# Patient Record
Sex: FEMALE | Race: WHITE | NOT HISPANIC OR LATINO | Employment: OTHER | URBAN - METROPOLITAN AREA
[De-identification: names, ages, dates, MRNs, and addresses within clinical notes are randomized per-mention and may not be internally consistent; named-entity substitution may affect disease eponyms.]

---

## 2023-01-01 ENCOUNTER — APPOINTMENT (OUTPATIENT)
Dept: RADIOLOGY | Facility: CLINIC | Age: 83
End: 2023-01-01
Attending: EMERGENCY MEDICINE
Payer: COMMERCIAL

## 2023-01-01 ENCOUNTER — APPOINTMENT (OUTPATIENT)
Dept: CT IMAGING | Facility: CLINIC | Age: 83
End: 2023-01-01
Attending: EMERGENCY MEDICINE
Payer: COMMERCIAL

## 2023-01-01 ENCOUNTER — LAB REQUISITION (OUTPATIENT)
Dept: LAB | Facility: CLINIC | Age: 83
End: 2023-01-01
Payer: COMMERCIAL

## 2023-01-01 ENCOUNTER — HOSPITAL ENCOUNTER (EMERGENCY)
Facility: CLINIC | Age: 83
Discharge: ANOTHER HOSPITAL | End: 2023-06-30
Attending: EMERGENCY MEDICINE | Admitting: EMERGENCY MEDICINE
Payer: COMMERCIAL

## 2023-01-01 ENCOUNTER — HOSPITAL ENCOUNTER (EMERGENCY)
Facility: CLINIC | Age: 83
Discharge: ANOTHER HOSPITAL | End: 2023-06-11
Attending: EMERGENCY MEDICINE | Admitting: EMERGENCY MEDICINE
Payer: COMMERCIAL

## 2023-01-01 ENCOUNTER — LAB REQUISITION (OUTPATIENT)
Dept: LAB | Facility: CLINIC | Age: 83
End: 2023-01-01

## 2023-01-01 VITALS
TEMPERATURE: 97.5 F | DIASTOLIC BLOOD PRESSURE: 78 MMHG | OXYGEN SATURATION: 97 % | HEIGHT: 66 IN | BODY MASS INDEX: 22.5 KG/M2 | SYSTOLIC BLOOD PRESSURE: 143 MMHG | HEART RATE: 65 BPM | RESPIRATION RATE: 27 BRPM | WEIGHT: 140 LBS

## 2023-01-01 VITALS
TEMPERATURE: 97.7 F | HEIGHT: 66 IN | RESPIRATION RATE: 18 BRPM | BODY MASS INDEX: 23.46 KG/M2 | WEIGHT: 146 LBS | SYSTOLIC BLOOD PRESSURE: 132 MMHG | OXYGEN SATURATION: 96 % | HEART RATE: 78 BPM | DIASTOLIC BLOOD PRESSURE: 60 MMHG

## 2023-01-01 DIAGNOSIS — R35.0 FREQUENCY OF MICTURITION: ICD-10-CM

## 2023-01-01 DIAGNOSIS — E11.9 TYPE 2 DIABETES MELLITUS WITHOUT COMPLICATIONS (H): ICD-10-CM

## 2023-01-01 DIAGNOSIS — G31.83 LEWY BODY DEMENTIA, UNSPECIFIED DEMENTIA SEVERITY, UNSPECIFIED WHETHER BEHAVIORAL, PSYCHOTIC, OR MOOD DISTURBANCE OR ANXIETY (H): ICD-10-CM

## 2023-01-01 DIAGNOSIS — E07.9 DISORDER OF THYROID, UNSPECIFIED: ICD-10-CM

## 2023-01-01 DIAGNOSIS — I10 ESSENTIAL (PRIMARY) HYPERTENSION: ICD-10-CM

## 2023-01-01 DIAGNOSIS — I26.93 SINGLE SUBSEGMENTAL PULMONARY EMBOLISM WITHOUT ACUTE COR PULMONALE (H): ICD-10-CM

## 2023-01-01 DIAGNOSIS — S06.6X0A SUBARACHNOID HEMORRHAGE FOLLOWING INJURY, NO LOSS OF CONSCIOUSNESS, INITIAL ENCOUNTER (H): Primary | ICD-10-CM

## 2023-01-01 DIAGNOSIS — E55.9 VITAMIN D DEFICIENCY, UNSPECIFIED: ICD-10-CM

## 2023-01-01 DIAGNOSIS — S00.83XA CONTUSION OF CHIN, INITIAL ENCOUNTER: ICD-10-CM

## 2023-01-01 DIAGNOSIS — G91.2 NORMAL PRESSURE HYDROCEPHALUS (H): ICD-10-CM

## 2023-01-01 DIAGNOSIS — E53.8 DEFICIENCY OF OTHER SPECIFIED B GROUP VITAMINS: ICD-10-CM
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
## 2023-01-01 DIAGNOSIS — F02.80 LEWY BODY DEMENTIA, UNSPECIFIED DEMENTIA SEVERITY, UNSPECIFIED WHETHER BEHAVIORAL, PSYCHOTIC, OR MOOD DISTURBANCE OR ANXIETY (H): ICD-10-CM

## 2023-01-01 DIAGNOSIS — S20.212A CHEST WALL CONTUSION, LEFT, INITIAL ENCOUNTER: ICD-10-CM

## 2023-01-01 DIAGNOSIS — M62.81 GENERALIZED MUSCLE WEAKNESS: ICD-10-CM

## 2023-01-01 LAB
ALBUMIN SERPL BCG-MCNC: 3.9 G/DL (ref 3.5–5.2)
ALBUMIN SERPL-MCNC: 3.7 G/DL (ref 3.5–5)
ALBUMIN UR-MCNC: 10 MG/DL
ALBUMIN UR-MCNC: 20 MG/DL
ALP SERPL-CCNC: 100 U/L (ref 35–104)
ALP SERPL-CCNC: 95 U/L (ref 45–120)
ALT SERPL W P-5'-P-CCNC: 11 U/L (ref 10–35)
ALT SERPL W P-5'-P-CCNC: 17 U/L (ref 0–45)
AMORPH CRY #/AREA URNS HPF: ABNORMAL /HPF
ANION GAP SERPL CALCULATED.3IONS-SCNC: 11 MMOL/L (ref 7–15)
ANION GAP SERPL CALCULATED.3IONS-SCNC: 8 MMOL/L (ref 5–18)
ANION GAP SERPL CALCULATED.3IONS-SCNC: 8 MMOL/L (ref 5–18)
APPEARANCE UR: ABNORMAL
APPEARANCE UR: ABNORMAL
APTT PPP: 26 SECONDS (ref 22–38)
AST SERPL W P-5'-P-CCNC: 18 U/L (ref 10–35)
AST SERPL W P-5'-P-CCNC: 28 U/L (ref 0–40)
ATRIAL RATE - MUSE: 68 BPM
BACTERIA UR CULT: NORMAL
BASOPHILS # BLD AUTO: 0.1 10E3/UL (ref 0–0.2)
BASOPHILS NFR BLD AUTO: 1 %
BILIRUB SERPL-MCNC: 0.4 MG/DL
BILIRUB SERPL-MCNC: 0.6 MG/DL (ref 0–1)
BILIRUB UR QL STRIP: NEGATIVE
BILIRUB UR QL STRIP: NEGATIVE
BUN SERPL-MCNC: 11.7 MG/DL (ref 8–23)
BUN SERPL-MCNC: 19 MG/DL (ref 8–28)
BUN SERPL-MCNC: 22 MG/DL (ref 8–28)
CALCIUM SERPL-MCNC: 10.4 MG/DL (ref 8.5–10.5)
CALCIUM SERPL-MCNC: 10.4 MG/DL (ref 8.5–10.5)
CALCIUM SERPL-MCNC: 9.8 MG/DL (ref 8.8–10.2)
CAOX CRY #/AREA URNS HPF: ABNORMAL /HPF
CHLORIDE BLD-SCNC: 100 MMOL/L (ref 98–107)
CHLORIDE BLD-SCNC: 104 MMOL/L (ref 98–107)
CHLORIDE SERPL-SCNC: 103 MMOL/L (ref 98–107)
CO2 SERPL-SCNC: 28 MMOL/L (ref 22–31)
CO2 SERPL-SCNC: 29 MMOL/L (ref 22–31)
COLOR UR AUTO: YELLOW
COLOR UR AUTO: YELLOW
CREAT SERPL-MCNC: 0.65 MG/DL (ref 0.51–0.95)
CREAT SERPL-MCNC: 0.68 MG/DL (ref 0.6–1.1)
CREAT SERPL-MCNC: 0.76 MG/DL (ref 0.6–1.1)
DEPRECATED CALCIDIOL+CALCIFEROL SERPL-MC: 48 UG/L (ref 20–75)
DEPRECATED HCO3 PLAS-SCNC: 24 MMOL/L (ref 22–29)
DIASTOLIC BLOOD PRESSURE - MUSE: NORMAL MMHG
EOSINOPHIL # BLD AUTO: 0 10E3/UL (ref 0–0.7)
EOSINOPHIL NFR BLD AUTO: 0 %
ERYTHROCYTE [DISTWIDTH] IN BLOOD BY AUTOMATED COUNT: 12.7 % (ref 10–15)
ERYTHROCYTE [DISTWIDTH] IN BLOOD BY AUTOMATED COUNT: 12.8 % (ref 10–15)
ERYTHROCYTE [DISTWIDTH] IN BLOOD BY AUTOMATED COUNT: 12.9 % (ref 10–15)
FLUAV RNA SPEC QL NAA+PROBE: NEGATIVE
FLUBV RNA RESP QL NAA+PROBE: NEGATIVE
FOLATE SERPL-MCNC: 11.4 NG/ML (ref 4.6–34.8)
GFR SERPL CREATININE-BSD FRML MDRD: 79 ML/MIN/1.73M2
GFR SERPL CREATININE-BSD FRML MDRD: 88 ML/MIN/1.73M2
GFR SERPL CREATININE-BSD FRML MDRD: 89 ML/MIN/1.73M2
GLUCOSE BLD-MCNC: 102 MG/DL (ref 70–125)
GLUCOSE BLD-MCNC: 109 MG/DL (ref 70–125)
GLUCOSE SERPL-MCNC: 100 MG/DL (ref 70–99)
GLUCOSE UR STRIP-MCNC: NEGATIVE MG/DL
GLUCOSE UR STRIP-MCNC: NEGATIVE MG/DL
HBA1C MFR BLD: 5.6 %
HCT VFR BLD AUTO: 39 % (ref 35–47)
HCT VFR BLD AUTO: 42.6 % (ref 35–47)
HCT VFR BLD AUTO: 44.2 % (ref 35–47)
HGB BLD-MCNC: 12.5 G/DL (ref 11.7–15.7)
HGB BLD-MCNC: 13.8 G/DL (ref 11.7–15.7)
HGB BLD-MCNC: 14 G/DL (ref 11.7–15.7)
HGB UR QL STRIP: NEGATIVE
HGB UR QL STRIP: NEGATIVE
HYALINE CASTS: 3 /LPF
IMM GRANULOCYTES # BLD: 0.1 10E3/UL
IMM GRANULOCYTES NFR BLD: 1 %
INR PPP: 1.07 (ref 0.85–1.15)
INTERPRETATION ECG - MUSE: NORMAL
KETONES UR STRIP-MCNC: NEGATIVE MG/DL
KETONES UR STRIP-MCNC: NEGATIVE MG/DL
LEUKOCYTE ESTERASE UR QL STRIP: ABNORMAL
LEUKOCYTE ESTERASE UR QL STRIP: NEGATIVE
LYMPHOCYTES # BLD AUTO: 2.3 10E3/UL (ref 0.8–5.3)
LYMPHOCYTES NFR BLD AUTO: 20 %
MAGNESIUM SERPL-MCNC: 2.5 MG/DL (ref 1.8–2.6)
MCH RBC QN AUTO: 29.5 PG (ref 26.5–33)
MCH RBC QN AUTO: 29.6 PG (ref 26.5–33)
MCH RBC QN AUTO: 29.6 PG (ref 26.5–33)
MCHC RBC AUTO-ENTMCNC: 31.7 G/DL (ref 31.5–36.5)
MCHC RBC AUTO-ENTMCNC: 32.1 G/DL (ref 31.5–36.5)
MCHC RBC AUTO-ENTMCNC: 32.4 G/DL (ref 31.5–36.5)
MCV RBC AUTO: 91 FL (ref 78–100)
MCV RBC AUTO: 92 FL (ref 78–100)
MCV RBC AUTO: 93 FL (ref 78–100)
MONOCYTES # BLD AUTO: 0.7 10E3/UL (ref 0–1.3)
MONOCYTES NFR BLD AUTO: 7 %
MUCOUS THREADS #/AREA URNS LPF: PRESENT /LPF
MUCOUS THREADS #/AREA URNS LPF: PRESENT /LPF
NEUTROPHILS # BLD AUTO: 8.1 10E3/UL (ref 1.6–8.3)
NEUTROPHILS NFR BLD AUTO: 71 %
NITRATE UR QL: NEGATIVE
NITRATE UR QL: NEGATIVE
NRBC # BLD AUTO: 0 10E3/UL
NRBC BLD AUTO-RTO: 0 /100
P AXIS - MUSE: -22 DEGREES
PH UR STRIP: 6 [PH] (ref 5–7)
PH UR STRIP: 7 [PH] (ref 5–7)
PLATELET # BLD AUTO: 298 10E3/UL (ref 150–450)
PLATELET # BLD AUTO: 304 10E3/UL (ref 150–450)
PLATELET # BLD AUTO: 333 10E3/UL (ref 150–450)
POTASSIUM BLD-SCNC: 4.1 MMOL/L (ref 3.5–5)
POTASSIUM BLD-SCNC: 4.3 MMOL/L (ref 3.5–5)
POTASSIUM SERPL-SCNC: 4.3 MMOL/L (ref 3.4–5.3)
PR INTERVAL - MUSE: 126 MS
PROT SERPL-MCNC: 6.4 G/DL (ref 6.4–8.3)
PROT SERPL-MCNC: 6.7 G/DL (ref 6–8)
QRS DURATION - MUSE: 82 MS
QT - MUSE: 416 MS
QTC - MUSE: 442 MS
R AXIS - MUSE: -18 DEGREES
RADIOLOGIST FLAGS: ABNORMAL
RBC # BLD AUTO: 4.22 10E6/UL (ref 3.8–5.2)
RBC # BLD AUTO: 4.67 10E6/UL (ref 3.8–5.2)
RBC # BLD AUTO: 4.74 10E6/UL (ref 3.8–5.2)
RBC URINE: 2 /HPF
RBC URINE: 4 /HPF
RSV RNA SPEC NAA+PROBE: NEGATIVE
SARS-COV-2 RNA RESP QL NAA+PROBE: NEGATIVE
SODIUM SERPL-SCNC: 137 MMOL/L (ref 136–145)
SODIUM SERPL-SCNC: 138 MMOL/L (ref 136–145)
SODIUM SERPL-SCNC: 140 MMOL/L (ref 136–145)
SP GR UR STRIP: 1.02 (ref 1–1.03)
SP GR UR STRIP: 1.02 (ref 1–1.03)
SQUAMOUS EPITHELIAL: 1 /HPF
SYSTOLIC BLOOD PRESSURE - MUSE: NORMAL MMHG
T AXIS - MUSE: 38 DEGREES
TROPONIN I SERPL-MCNC: <0.01 NG/ML (ref 0–0.29)
TSH SERPL DL<=0.005 MIU/L-ACNC: 1.37 UIU/ML (ref 0.3–5)
TSH SERPL DL<=0.005 MIU/L-ACNC: 1.64 UIU/ML (ref 0.3–4.2)
UROBILINOGEN UR STRIP-MCNC: <2 MG/DL
UROBILINOGEN UR STRIP-MCNC: NORMAL MG/DL
VENTRICULAR RATE- MUSE: 68 BPM
VIT B12 SERPL-MCNC: 854 PG/ML (ref 232–1245)
WBC # BLD AUTO: 11.3 10E3/UL (ref 4–11)
WBC # BLD AUTO: 6.6 10E3/UL (ref 4–11)
WBC # BLD AUTO: 7.2 10E3/UL (ref 4–11)
WBC URINE: 0 /HPF
WBC URINE: <1 /HPF

## 2023-01-01 PROCEDURE — 96376 TX/PRO/DX INJ SAME DRUG ADON: CPT

## 2023-01-01 PROCEDURE — 250N000011 HC RX IP 250 OP 636: Performed by: EMERGENCY MEDICINE

## 2023-01-01 PROCEDURE — 93005 ELECTROCARDIOGRAM TRACING: CPT | Performed by: EMERGENCY MEDICINE

## 2023-01-01 PROCEDURE — 85730 THROMBOPLASTIN TIME PARTIAL: CPT | Performed by: EMERGENCY MEDICINE

## 2023-01-01 PROCEDURE — 70450 CT HEAD/BRAIN W/O DYE: CPT

## 2023-01-01 PROCEDURE — 74174 CTA ABD&PLVS W/CONTRAST: CPT

## 2023-01-01 PROCEDURE — 73590 X-RAY EXAM OF LOWER LEG: CPT | Mod: RT

## 2023-01-01 PROCEDURE — 84443 ASSAY THYROID STIM HORMONE: CPT | Mod: ORL | Performed by: FAMILY MEDICINE

## 2023-01-01 PROCEDURE — 96375 TX/PRO/DX INJ NEW DRUG ADDON: CPT | Mod: 59

## 2023-01-01 PROCEDURE — 85025 COMPLETE CBC W/AUTO DIFF WBC: CPT | Performed by: EMERGENCY MEDICINE

## 2023-01-01 PROCEDURE — 36415 COLL VENOUS BLD VENIPUNCTURE: CPT | Performed by: EMERGENCY MEDICINE

## 2023-01-01 PROCEDURE — 250N000011 HC RX IP 250 OP 636: Mod: JZ | Performed by: EMERGENCY MEDICINE

## 2023-01-01 PROCEDURE — P9603 ONE-WAY ALLOW PRORATED MILES: HCPCS | Mod: ORL | Performed by: FAMILY MEDICINE

## 2023-01-01 PROCEDURE — 96374 THER/PROPH/DIAG INJ IV PUSH: CPT | Mod: 59

## 2023-01-01 PROCEDURE — 83735 ASSAY OF MAGNESIUM: CPT | Performed by: EMERGENCY MEDICINE

## 2023-01-01 PROCEDURE — 74018 RADEX ABDOMEN 1 VIEW: CPT

## 2023-01-01 PROCEDURE — 81001 URINALYSIS AUTO W/SCOPE: CPT | Mod: ORL | Performed by: FAMILY MEDICINE

## 2023-01-01 PROCEDURE — 72125 CT NECK SPINE W/O DYE: CPT

## 2023-01-01 PROCEDURE — 87086 URINE CULTURE/COLONY COUNT: CPT | Mod: ORL | Performed by: FAMILY MEDICINE

## 2023-01-01 PROCEDURE — 80053 COMPREHEN METABOLIC PANEL: CPT | Performed by: EMERGENCY MEDICINE

## 2023-01-01 PROCEDURE — 81001 URINALYSIS AUTO W/SCOPE: CPT | Performed by: EMERGENCY MEDICINE

## 2023-01-01 PROCEDURE — 85027 COMPLETE CBC AUTOMATED: CPT | Performed by: EMERGENCY MEDICINE

## 2023-01-01 PROCEDURE — 85027 COMPLETE CBC AUTOMATED: CPT | Mod: ORL | Performed by: FAMILY MEDICINE

## 2023-01-01 PROCEDURE — 82607 VITAMIN B-12: CPT | Mod: ORL | Performed by: FAMILY MEDICINE

## 2023-01-01 PROCEDURE — 85610 PROTHROMBIN TIME: CPT | Performed by: EMERGENCY MEDICINE

## 2023-01-01 PROCEDURE — 99285 EMERGENCY DEPT VISIT HI MDM: CPT | Mod: 25

## 2023-01-01 PROCEDURE — 82746 ASSAY OF FOLIC ACID SERUM: CPT | Mod: ORL | Performed by: FAMILY MEDICINE

## 2023-01-01 PROCEDURE — 87637 SARSCOV2&INF A&B&RSV AMP PRB: CPT | Performed by: EMERGENCY MEDICINE

## 2023-01-01 PROCEDURE — 80053 COMPREHEN METABOLIC PANEL: CPT | Mod: ORL | Performed by: FAMILY MEDICINE

## 2023-01-01 PROCEDURE — 84443 ASSAY THYROID STIM HORMONE: CPT | Performed by: EMERGENCY MEDICINE

## 2023-01-01 PROCEDURE — 72170 X-RAY EXAM OF PELVIS: CPT

## 2023-01-01 PROCEDURE — 84484 ASSAY OF TROPONIN QUANT: CPT | Performed by: EMERGENCY MEDICINE

## 2023-01-01 PROCEDURE — 99291 CRITICAL CARE FIRST HOUR: CPT | Mod: 25

## 2023-01-01 PROCEDURE — 82306 VITAMIN D 25 HYDROXY: CPT | Mod: ORL | Performed by: FAMILY MEDICINE

## 2023-01-01 PROCEDURE — 72100 X-RAY EXAM L-S SPINE 2/3 VWS: CPT

## 2023-01-01 PROCEDURE — 73552 X-RAY EXAM OF FEMUR 2/>: CPT | Mod: RT

## 2023-01-01 PROCEDURE — 83036 HEMOGLOBIN GLYCOSYLATED A1C: CPT | Mod: ORL | Performed by: FAMILY MEDICINE

## 2023-01-01 PROCEDURE — 80048 BASIC METABOLIC PNL TOTAL CA: CPT | Performed by: EMERGENCY MEDICINE

## 2023-01-01 PROCEDURE — 36415 COLL VENOUS BLD VENIPUNCTURE: CPT | Mod: ORL | Performed by: FAMILY MEDICINE

## 2023-01-01 RX ORDER — VITAMIN B COMPLEX
25 TABLET ORAL DAILY
COMMUNITY

## 2023-01-01 RX ORDER — HYDRALAZINE HYDROCHLORIDE 20 MG/ML
10 INJECTION INTRAMUSCULAR; INTRAVENOUS EVERY 10 MIN PRN
Status: DISCONTINUED | OUTPATIENT
Start: 2023-01-01 | End: 2023-01-01 | Stop reason: HOSPADM

## 2023-01-01 RX ORDER — LABETALOL HYDROCHLORIDE 5 MG/ML
10 INJECTION, SOLUTION INTRAVENOUS EVERY 10 MIN PRN
Status: DISCONTINUED | OUTPATIENT
Start: 2023-01-01 | End: 2023-01-01 | Stop reason: HOSPADM

## 2023-01-01 RX ORDER — HYDRALAZINE HYDROCHLORIDE 20 MG/ML
10 INJECTION INTRAMUSCULAR; INTRAVENOUS ONCE
Status: COMPLETED | OUTPATIENT
Start: 2023-01-01 | End: 2023-01-01

## 2023-01-01 RX ORDER — SODIUM CHLORIDE 9 MG/ML
INJECTION, SOLUTION INTRAVENOUS CONTINUOUS PRN
Status: DISCONTINUED | OUTPATIENT
Start: 2023-01-01 | End: 2023-01-01 | Stop reason: HOSPADM

## 2023-01-01 RX ORDER — IOPAMIDOL 755 MG/ML
100 INJECTION, SOLUTION INTRAVASCULAR ONCE
Status: COMPLETED | OUTPATIENT
Start: 2023-01-01 | End: 2023-01-01

## 2023-01-01 RX ORDER — DONEPEZIL HYDROCHLORIDE 10 MG/1
20 TABLET, FILM COATED ORAL AT BEDTIME
COMMUNITY

## 2023-01-01 RX ORDER — ACETAMINOPHEN 325 MG/1
650 TABLET ORAL EVERY 4 HOURS PRN
COMMUNITY

## 2023-01-01 RX ORDER — FLUOXETINE 40 MG/1
40 CAPSULE ORAL AT BEDTIME
COMMUNITY

## 2023-01-01 RX ORDER — MIRTAZAPINE 15 MG/1
15 TABLET, FILM COATED ORAL AT BEDTIME
COMMUNITY

## 2023-01-01 RX ORDER — PYRIDOXINE HCL (VITAMIN B6) 100 MG
500 TABLET ORAL DAILY
COMMUNITY

## 2023-01-01 RX ORDER — HYDROMORPHONE HYDROCHLORIDE 1 MG/ML
0.5 INJECTION, SOLUTION INTRAMUSCULAR; INTRAVENOUS; SUBCUTANEOUS EVERY 30 MIN PRN
Status: DISCONTINUED | OUTPATIENT
Start: 2023-01-01 | End: 2023-01-01 | Stop reason: HOSPADM

## 2023-01-01 RX ORDER — DOCUSATE SODIUM 100 MG/1
100 CAPSULE, LIQUID FILLED ORAL EVERY OTHER DAY
COMMUNITY

## 2023-01-01 RX ADMIN — HYDROMORPHONE HYDROCHLORIDE 0.5 MG: 1 INJECTION, SOLUTION INTRAMUSCULAR; INTRAVENOUS; SUBCUTANEOUS at 06:05

## 2023-01-01 RX ADMIN — HYDRALAZINE HYDROCHLORIDE 10 MG: 20 INJECTION, SOLUTION INTRAMUSCULAR; INTRAVENOUS at 09:48

## 2023-01-01 RX ADMIN — IOPAMIDOL 90 ML: 755 INJECTION, SOLUTION INTRAVENOUS at 07:10

## 2023-01-01 RX ADMIN — HYDRALAZINE HYDROCHLORIDE 10 MG: 20 INJECTION, SOLUTION INTRAMUSCULAR; INTRAVENOUS at 09:11

## 2023-01-01 ASSESSMENT — ACTIVITIES OF DAILY LIVING (ADL)
ADLS_ACUITY_SCORE: 35

## 2023-06-11 PROBLEM — G91.9 HYDROCEPHALUS WITH OPERATING SHUNT (H): Status: ACTIVE | Noted: 2018-03-27

## 2023-06-11 PROBLEM — F02.80 LEWY BODY DEMENTIA (H): Status: ACTIVE | Noted: 2022-01-01

## 2023-06-11 PROBLEM — M25.632 WRIST STIFFNESS, LEFT: Status: ACTIVE | Noted: 2017-06-06

## 2023-06-11 PROBLEM — M79.642 HAND PAIN, LEFT: Status: ACTIVE | Noted: 2017-06-06

## 2023-06-11 PROBLEM — M43.16 SPONDYLOLISTHESIS OF LUMBAR REGION: Status: ACTIVE | Noted: 2018-12-17

## 2023-06-11 PROBLEM — R41.89 COGNITIVE DECLINE: Status: ACTIVE | Noted: 2021-07-12

## 2023-06-11 PROBLEM — G91.2 NORMAL PRESSURE HYDROCEPHALUS (H): Status: ACTIVE | Noted: 2018-03-14

## 2023-06-11 PROBLEM — M54.50 LOW BACK PAIN: Status: ACTIVE | Noted: 2020-07-21

## 2023-06-11 PROBLEM — C44.91 MALIGNANT BASAL CELL NEOPLASM OF SKIN: Status: ACTIVE | Noted: 2023-01-01

## 2023-06-11 PROBLEM — G31.83 LEWY BODY DEMENTIA (H): Status: ACTIVE | Noted: 2022-01-01

## 2023-06-11 PROBLEM — Z87.81 HISTORY OF WRIST FRACTURE: Status: ACTIVE | Noted: 2017-06-06

## 2023-06-11 NOTE — ED PROVIDER NOTES
EMERGENCY DEPARTMENT ENCOUNTER      NAME: Lo Ochoa  AGE: 82 year old female  YOB: 1940  MRN: [id number]  EVALUATION DATE & TIME: 6/11/2023  2:12 PM    PCP: No Ref-Primary, Physician    ED PROVIDER: Samantha Ya MD      Chief Complaint   Patient presents with     Generalized Weakness         FINAL IMPRESSION:  1. Generalized muscle weakness    2. Normal pressure hydrocephalus (H)    3. Lewy body dementia, unspecified dementia severity, unspecified whether behavioral, psychotic, or mood disturbance or anxiety (H)          ED COURSE & MEDICAL DECISION MAKING:    Pertinent Labs & Imaging studies reviewed. (See chart for details)    2:48 PM I introduced myself to the patient, obtained patient history, performed a physical exam, and discussed plan for ED workup including potential diagnostic laboratory/imaging studies and interventions.  5:35 PM I rechecked and updated the patient.   7:06 PM I spoke with [facility] about the patient. They are unable to get a hold of the Hospitalist and will keep trying to contact them.  7:42 PM I spoke with neurosurgery at [facility]. They plan to see the patient tomorrow and agree she should be admitted.  8:10 PM I spoke with the hospitalist at [facility]. They agree to accept the patient.    82 year old female with history of Lewy body dementia, normal pressure hydrocephalus with  shunt, depression, spondylolisthesis of the lumbar region presents to the Emergency Department for evaluation of bilateral leg weakness.  Patient is here with her daughter.  Daughter reports that over the past week or so the patient may have been having a little bit of trouble ambulating but specifically since yesterday evening she had been requiring people to help her get out of a chair or bed and help her to ambulate.  Daughter describes that she is able to take a step but it is very slow and then she needs to stop.  Patient typically ambulates  without an assistive device.  This is much different than her baseline per daughter.  Daughter does report that she has had a bit of a cognitive decline slowly but her mental status is at its baseline.  Daughter also reports that she appears to have normal strength when she is lying in bed but it is specifically when she tries to walk that she is having this difficulty.  States she is very slow and shaky while trying to do this.  Patient reports that it feels like something is not communicated with her legs to try to take a step forward.  She does report that she had a fall approximately 3 days ago where she lost her balance and fell against the wall and then slowly slid down to the floor.  Sounds like she did hit her head but denies any headache or loss of consciousness.  Denies any neck or back pain.  Denies any pain initially in the legs hip or pelvis.  No changes to her medication.  Daughter does report that she has some parkinsonian-like symptoms but does not appear to be on any medication specifically for this.  Last had the shunt evaluated in 2021 and this was working appropriately.  Her shunt was placed by neurosurgery at [facility].  No upper extremity weakness.  No paresthesias or numbness.    On exam here, the patient is in no acute distress.  She denies being in any pain.  Vital signs are within normal limits and she is afebrile.  Does not really have any specific bony tenderness but with examination of the right hip area she states maybe there is some slight discomfort when I am pressing in the area but does not know Short otherwise.  She also reports some discomfort when I am pressing on her right tibia and fibula diffusely.  Again no point bony tenderness.  Pelvis is stable and nontender to compression.  Also has no midline tenderness thoracic or lumbar spine and normal range of motion of the back.  She is able to sit up in bed with some slight assistance without discomfort in the back.  States she does  not have any pain when she is ambulating.  Neurologically she does not have any deficits on my exam.  While she is lying in the bed she has 5-5 strength in all 4 extremities including the bilateral extremities.  No sensation deficit.  She has normal reflexes 2+ in the patellar and Achilles thus making Guillain-Barré less likely.  Considered CVA however again has no focal deficit here lying in bed and she is outside of the tenecteplase or thrombectomy window at this point.  Did consider the potential of shunt malfunction with her history of normal pressure hydrocephalus.  Also considered infectious etiology such as UTI or pneumonia.  Chest x-ray is present on the shunt series that we are obtaining so will evaluate this.  Obtained a COVID-19 test and laboratory studies.  Want to ensure no sign of injury or fracture with the previous fall although again she really is complaining of no pain here.  She has no pain with logroll of either leg in the hip area.  Will obtain x-ray of the pelvis, right femur, and right tibia and fibula as well as the lumbar spine.  Has no focal deficit in the lower extremities on exam here that would suggest spinal cord compression.  Also considered potential cardiac etiology and obtain an EKG.    EKG reveals normal sinus rhythm.  Normal intervals.  Some flattening of the T waves but no specific sign of acute ischemia.  Obtain a troponin which is less than 0.01 and she has had no chest pain or shortness of breath overall felt that cardiac etiology was unlikely.  Did also obtain a TSH which is in normal limits making thyroid dysfunction unlikely.  Magnesium within normal limits.  BMP unremarkable with normal electrolytes.  Kidney function within normal limits.  CBC reveals a normal blood cell count of 7.2 and a hemoglobin of 13.8.  Has not had any new medication changes making medication side effect unlikely.  COVID-19 influenza and RSV is negative.  Again with her intact reflexes and normal  exam while lying in bed felt like Guillain-Barré was less likely.  Analysis without any white blood cells and no sign of UTI.    CT of the head was obtained as well as shunt malfunction survey x-ray.  X-rays of the pelvis, right femur, and right tibia and fibula without any acute fracture.  X-ray lumbar spine reveals L4-L5 posterior fixation without acute hardware complication.  Mild levocurvature.  7 mm anterolisthesis of L4 on L5 minimal retrolisthesis of L2 on L3 with severe L2-L3 disc height loss.  Per her chart this seems to be baseline for her.  Age-indeterminate L1 superior endplate deformity with mild height loss which is age-indeterminate.  Again on reevaluation she is denying having any pain in her back and has no pain with ambulating per patient.  She also has no tenderness to palpation in the back in this area to suggest this to be an acute fracture as the cause of her bilateral leg weakness.  Thus felt this was less likely to be causing her symptoms.  Also has no focal deficit on exam to suggest that she would have spinal cord compression.  CT of the head reveals no acute transcortical infarct intracranial hemorrhage or mass effect.  Supratentorial ventriculomegaly with ventricular shunt in place.  Lack of comparison imaging precludes assessment for stability/change.  Findings compatible with chronic ischemic microvascular white matter change.  Features of chronic left maxillary sinusitis.  Shunt malfunction series not show any  shunt catheter tubing discontinuity.  No sign of pneumonia.     At this point, I am uncertain of the cause of her symptoms but do feel she warrants admission for further evaluation.  With the fact that she has had a  shunt placed and we are uncertain if this ventriculomegaly is worse than previous do feel that she should be admitted where her neurosurgery team is located which is at [facility].  Patient and her daughter are in agreement with this plan.  I spoke with the PA with  neurosurgery at [facility] who stated they would see her tomorrow but did have low suspicion this was related to the shunt but they would need to compare images.  I did call our radiology department to make sure we push the images to [place].  I spoke with Dr. Dixon the hospitalist at [facility] who accepted the patient for admission.  Do also question whether this could be potentially some parkinsonian symptoms or progression of her dementia and they will have neurology evaluate her at [facility] as well.  Patient was in agreement with this plan.  She was transferred to [facility] for admission by EMS in stable condition.    ED Course as of 06/11/23 2056   Sun Jun 11, 2023   2001 Dr. Dixon, [facility] Hospitalist accepts the patient for admission       At the conclusion of the encounter I discussed the results of all of the tests and the disposition. The questions were answered. The patient or family acknowledged understanding and was agreeable with the care plan.          Medical Decision Making    History:    Supplemental history from: Documented in chart, if applicable and Family Member/Significant Other    External Record(s) reviewed: Documented in chart, if applicable.    Work Up:    Chart documentation includes differential considered and any EKGs or imaging independently interpreted by provider.    In additional to work up documented, I considered the following work up: Documented in chart, if applicable.    External consultation:    Discussion of management with another provider: Hospitalist and Neurosurgery    Complicating factors:    Care impacted by chronic illness: Hyperlipidemia and Other: Lewy Body Dementia, Parkinson's disease, Hydrocephalus with operating shunt.    Care affected by social determinants of health: N/A    Disposition considerations: Admit.      MEDICATIONS GIVEN IN THE EMERGENCY:  Medications - No data to display    NEW PRESCRIPTIONS STARTED AT TODAY'S ER VISIT  New Prescriptions     No medications on file          =================================================================    [facility]    Patient information was obtained from: Patients Daughter and Patient    Use of : N/A      Lo Ochoa is a 82 year old female with a pertinent history of Lewy Body Dementia, basal cell neoplasm of skin, and Hyperlipidemia who presents to this ED for evaluation of bilateral leg weakness.     Patient's daughter reports that the patient is normally able to walk without assistance but has been getting progressively weaker. Last night at 1700 (22 hours ago) patient was unable to move her legs while standing and had to be carried prompting an ED visit today. She reports one fall a couple days ago in the shower where she hit the back of her head on the wall and slid to the ground. Patients daughter reports that the patient has a cough at night. Patient is on Omeprazole, Donepezil, Mirtazapine, and these are unchanged. No new medications or new dosages. The patient currently lives in assisted living. The patient denies any history of symptoms like these. She also denies being on anticoagulation. Patient denies any fever, pain anywhere, nausea, vomiting, diarrhea, dysuria or hematuria, chest pain, shortness of breath, abdominal pain, numbness, tingling, headcahe or any other complaints at this time.  States that her arms do not feel weak.    Of note patient's  shunt for hydrocephalus was last evaluated three or four years ago and this surgery was performed at [facility].       REVIEW OF SYSTEMS    Pertinent positives and negatives are documented in the HPI. All other systems reviewed and are negative.      PAST MEDICAL HISTORY:  No past medical history on file.    PAST SURGICAL HISTORY:  No past surgical history on file.        CURRENT MEDICATIONS:    No current outpatient medications on file.      ALLERGIES:  Not on File    FAMILY HISTORY:  No family history on file.    SOCIAL HISTORY:   Social  "History     Socioeconomic History     Marital status:        VITALS:  BP (!) 158/72   Pulse 52   Temp 97.5  F (36.4  C) (Oral)   Resp 16   Ht 1.676 m (5' 6\")   Wt 63.5 kg (140 lb)   SpO2 97%   BMI 22.60 kg/m      PHYSICAL EXAM    Physical Exam  Constitutional: Well developed, Well nourished, NAD  HENT: Normocephalic, Atraumatic, scalp hematoma.  No septal hematoma.  No obvious intraoral trauma.  No hemotympanum bilaterally.  No facial bone tenderness or scalp tenderness.  Bilateral external ears normal, Oropharynx normal, mucous membranes moist, Nose normal. Neck- Normal range of motion, No midline cervical spine tenderness, Supple, No stridor.  Full range of motion the neck without pain or rigidity.  Eyes: PERRL, EOMI, Conjunctiva normal, No discharge.   Respiratory: Normal breath sounds, No respiratory distress, No wheezing or crackles, Speaks in full sentences easily.   Cardiovascular: Normal heart rate, Regular rhythm, No murmurs, No rubs, No gallops. 2+ radial pulses bilaterally. No chest wall tenderness.  GI: Bowel sounds normal, Soft, No tenderness, No masses, No rebound or guarding. No CVA tenderness bilaterally.  Musculoskeletal: 2+ DP pulses. No notable lower extremity edema. No cyanosis, No clubbing. Good range of motion in all major joints. No point bony tenderness to palpation or major deformities noted.  She reports that when I palpate the area of her right hip there is maybe some discomfort.  Also reported some mild discomfort throughout the tibia and fibula on the right but no point bony tenderness.  Has normal range of motion of all joints of all 4 extremities.  Pelvis is stable and nontender to compression.  Compartments are soft and compressible.  No tenderness of the CTLS spine.  Normal range of motion of the back.  Integument: Warm, Dry, No erythema, No rash. No petechiae.   Neurologic: Alert & oriented x 3, at times appears mildly confused, 5/5 strength in all 4 extremities " bilaterally occluding hip flexion/extension, knee flexion/extension, and dorsiflexion and plantarflexion bilaterally. Sensation intact to light touch in all 4 extremities and the face bilaterally. No focal deficits noted.  Sensation intact throughout all dermatomes of the bilateral lower extremities.  No saddle anesthesia.  Psychiatric: Flat affect. Cooperative.     LAB:  All pertinent labs reviewed and interpreted.  Results for orders placed or performed during the hospital encounter of 06/11/23   Head CT w/o contrast    Impression    IMPRESSION:  1.  No acute transcortical infarct, intracranial hemorrhage, or mass effect.   2.  Supratentorial ventriculomegaly with ventricular shunt in place. Lack of comparison imaging precludes assessment for stability/change.  3.  Findings compatible with chronic ischemic microvascular white matter change.  4.  Features of chronic left maxillary sinusitis.       XR Shunt Malfunction Survey    Impression    IMPRESSION: Frontal and lateral views of the skull, frontal views of the chest, and frontal views of the abdomen/pelvis. A right frontal approach  shunt catheter is in place, with extracranial catheter tubing projecting over the right posterior lateral   skull, right neck soft tissues, right thorax, right upper to mid abdomen, crossing midline toward the left and extending inferiorly into the pelvis and looping retrograde, with tip projecting over the right paramedian upper pelvis region. No  shunt   catheter tubing discontinuity is identified on these views.    On the frontal view of the skull, there is a metallic density structure projecting over the left parasymphyseal mandible region, and a similar-appearing structure is seen projecting over the left paramedian lower cervical spine on the frontal view the   chest, and this may be external to the patient. Recommend clinical correlation. Electronic device with rectangular shaped projects over the left paramedian lower  thorax. There is posterior fusion hardware projecting over the lower lumbar spine.   XR Pelvis 1/2 Views    Impression    IMPRESSION:   1.  No fracture or joint malalignment.  2.  Mild right hip degenerative arthrosis.  3.  Mild/moderate right knee degenerative arthrosis.  4.  Degenerative and postoperative changes in the lumbar spine.  5.   shunt tubing projecting over the pelvis.        Lumbar spine XR, 2-3 views    Impression    IMPRESSION: L4-L5 posterior fixation without acute hardware complication. Mild levocurvature. 7 mm anterolisthesis of L4 on L5. Minimal retrolisthesis of L2 on L3 with severe L2-L3 disc height loss. Age-indeterminate L1 superior endplate deformity with   mild height loss.   XR Femur Right 2 Views    Impression    IMPRESSION:   1.  No fracture or joint malalignment.  2.  Mild right hip degenerative arthrosis.  3.  Mild/moderate right knee degenerative arthrosis.  4.  Degenerative and postoperative changes in the lumbar spine.  5.   shunt tubing projecting over the pelvis.        XR Tibia and Fibula Right 2 Views    Impression    IMPRESSION:  1.  No fracture or joint malalignment.  2.  Mild/moderate right knee degenerative arthrosis.     UA with Microscopic reflex to Culture    Specimen: Urine, Clean Catch   Result Value Ref Range    Color Urine Yellow Colorless, Straw, Light Yellow, Yellow    Appearance Urine Turbid (A) Clear    Glucose Urine Negative Negative mg/dL    Bilirubin Urine Negative Negative    Ketones Urine Negative Negative mg/dL    Specific Gravity Urine 1.021 1.001 - 1.030    Blood Urine Negative Negative    pH Urine 7.0 5.0 - 7.0    Protein Albumin Urine 10 (A) Negative mg/dL    Urobilinogen Urine <2.0 <2.0 mg/dL    Nitrite Urine Negative Negative    Leukocyte Esterase Urine 25 Jacquelyn/uL (A) Negative    Mucus Urine Present (A) None Seen /LPF    Amorphous Crystals Urine Few (A) None Seen /HPF    RBC Urine 2 <=2 /HPF    WBC Urine <1 <=5 /HPF    Squamous Epithelials Urine 1  <=1 /HPF   CBC (+ platelets, no diff)   Result Value Ref Range    WBC Count 7.2 4.0 - 11.0 10e3/uL    RBC Count 4.67 3.80 - 5.20 10e6/uL    Hemoglobin 13.8 11.7 - 15.7 g/dL    Hematocrit 42.6 35.0 - 47.0 %    MCV 91 78 - 100 fL    MCH 29.6 26.5 - 33.0 pg    MCHC 32.4 31.5 - 36.5 g/dL    RDW 12.9 10.0 - 15.0 %    Platelet Count 304 150 - 450 10e3/uL   Basic metabolic panel   Result Value Ref Range    Sodium 140 136 - 145 mmol/L    Potassium 4.3 3.5 - 5.0 mmol/L    Chloride 104 98 - 107 mmol/L    Carbon Dioxide (CO2) 28 22 - 31 mmol/L    Anion Gap 8 5 - 18 mmol/L    Urea Nitrogen 19 8 - 28 mg/dL    Creatinine 0.68 0.60 - 1.10 mg/dL    Calcium 10.4 8.5 - 10.5 mg/dL    Glucose 102 70 - 125 mg/dL    GFR Estimate 88 >60 mL/min/1.73m2   Result Value Ref Range    Magnesium 2.5 1.8 - 2.6 mg/dL   TSH with free T4 reflex   Result Value Ref Range    TSH 1.37 0.30 - 5.00 uIU/mL   Troponin I (now)   Result Value Ref Range    Troponin I <0.01 0.00 - 0.29 ng/mL   Symptomatic Influenza A/B, RSV, & SARS-CoV2 PCR (COVID-19) Nose    Specimen: Nose; Swab   Result Value Ref Range    Influenza A PCR Negative Negative    Influenza B PCR Negative Negative    RSV PCR Negative Negative    SARS CoV2 PCR Negative Negative   ECG 12-LEAD WITH MUSE (LHE)   Result Value Ref Range    Systolic Blood Pressure  mmHg    Diastolic Blood Pressure  mmHg    Ventricular Rate 68 BPM    Atrial Rate 68 BPM    MT Interval 126 ms    QRS Duration 82 ms     ms    QTc 442 ms    P Axis -22 degrees    R AXIS -18 degrees    T Axis 38 degrees    Interpretation ECG       Sinus rhythm  Septal infarct , age undetermined  Abnormal ECG  No previous ECGs available  Confirmed by SEE ED PROVIDER NOTE FOR, ECG INTERPRETATION ([id number]),  Dee Harvey ([id number]) on 6/11/2023 12:44:35 PM         RADIOLOGY:  Reviewed all pertinent imaging. Please see official radiology report.  XR Tibia and Fibula Right 2 Views   Final Result   IMPRESSION:   1.  No fracture or joint  malalignment.   2.  Mild/moderate right knee degenerative arthrosis.         XR Femur Right 2 Views   Final Result   IMPRESSION:    1.  No fracture or joint malalignment.   2.  Mild right hip degenerative arthrosis.   3.  Mild/moderate right knee degenerative arthrosis.   4.  Degenerative and postoperative changes in the lumbar spine.   5.   shunt tubing projecting over the pelvis.             Lumbar spine XR, 2-3 views   Final Result   IMPRESSION: L4-L5 posterior fixation without acute hardware complication. Mild levocurvature. 7 mm anterolisthesis of L4 on L5. Minimal retrolisthesis of L2 on L3 with severe L2-L3 disc height loss. Age-indeterminate L1 superior endplate deformity with    mild height loss.      XR Pelvis 1/2 Views   Final Result   IMPRESSION:    1.  No fracture or joint malalignment.   2.  Mild right hip degenerative arthrosis.   3.  Mild/moderate right knee degenerative arthrosis.   4.  Degenerative and postoperative changes in the lumbar spine.   5.   shunt tubing projecting over the pelvis.             XR Shunt Malfunction Survey   Final Result   IMPRESSION: Frontal and lateral views of the skull, frontal views of the chest, and frontal views of the abdomen/pelvis. A right frontal approach  shunt catheter is in place, with extracranial catheter tubing projecting over the right posterior lateral    skull, right neck soft tissues, right thorax, right upper to mid abdomen, crossing midline toward the left and extending inferiorly into the pelvis and looping retrograde, with tip projecting over the right paramedian upper pelvis region. No  shunt    catheter tubing discontinuity is identified on these views.      On the frontal view of the skull, there is a metallic density structure projecting over the left parasymphyseal mandible region, and a similar-appearing structure is seen projecting over the left paramedian lower cervical spine on the frontal view the    chest, and this may be external  to the patient. Recommend clinical correlation. Electronic device with rectangular shaped projects over the left paramedian lower thorax. There is posterior fusion hardware projecting over the lower lumbar spine.      Head CT w/o contrast   Final Result   IMPRESSION:   1.  No acute transcortical infarct, intracranial hemorrhage, or mass effect.    2.  Supratentorial ventriculomegaly with ventricular shunt in place. Lack of comparison imaging precludes assessment for stability/change.   3.  Findings compatible with chronic ischemic microvascular white matter change.   4.  Features of chronic left maxillary sinusitis.                EKG:    Performed at: 12:17    Impression: Sinus rhythm  Septal infarct, age undetermined  Abnormal ECG    Rate: 68  Rhythm: Sinus rhythm  Axis: -18  NM Interval: 126  QRS Interval: 82  QTc Interval: 442  Comparison: No previous ECGs available.    I have independently reviewed and interpreted the EKG(s) documented above.    PROCEDURES:   None      [facility] Documentation:   CMS Diagnoses:               IRene , am serving as a scribe to document services personally performed by Samantha Ya MD based on my observation and the provider's statements to me. I, Samantha Ya MD, attest that Rene Jett  is acting in a scribe capacity, has observed my performance of the services and has documented them in accordance with my direction.    Samantha Ya MD  [facility] EMERGENCY ROOM  [address] [address] [phone]      Samantha Ya MD  06/11/23 1558

## 2023-06-11 NOTE — ED TRIAGE NOTES
Patient arrives to the ER with weakness . Patient started noticing increased weakness about 2 days ago. At baseline, she can ambulated without an assistive device.  But over the past 2 days she has been unable to get up without assistance. VSS, Afebrile.     Triage Assessment     Row Name 06/11/23 1200       Triage Assessment (Adult)    Airway WDL WDL       Respiratory WDL    Respiratory WDL WDL       Skin Circulation/Temperature WDL    Skin Circulation/Temperature WDL WDL       Cardiac WDL    Cardiac WDL WDL       Peripheral/Neurovascular WDL    Peripheral Neurovascular WDL X  weakness, feeling of not being able to stand up.       Cognitive/Neuro/Behavioral WDL    Cognitive/Neuro/Behavioral WDL WDL

## 2023-06-12 NOTE — ED NOTES
Called [facility] unit  to give pt report. Stated they were not ready for report at this time and would call back.

## 2023-06-30 PROBLEM — R29.898 WEAKNESS OF BOTH LOWER EXTREMITIES: Status: ACTIVE | Noted: 2023-01-01

## 2023-06-30 PROBLEM — F02.80 DEMENTIA IN OTHER DISEASES CLASSIFIED ELSEWHERE, UNSPECIFIED SEVERITY, WITHOUT BEHAVIORAL DISTURBANCE, PSYCHOTIC DISTURBANCE, MOOD DISTURBANCE, AND ANXIETY (H): Status: ACTIVE | Noted: 2023-01-01

## 2023-06-30 PROBLEM — G25.81 RESTLESS LEG SYNDROME: Status: ACTIVE | Noted: 2023-01-01

## 2023-06-30 PROBLEM — Z87.19 HISTORY OF DIVERTICULITIS: Status: ACTIVE | Noted: 2023-01-01

## 2023-06-30 PROBLEM — F32.4 MAJOR DEPRESSIVE DISORDER IN PARTIAL REMISSION (H): Status: ACTIVE | Noted: 2023-01-01

## 2023-06-30 NOTE — PROGRESS NOTES
Neurosurgery Treatment Plan:   Called by [facility] 82 year old female with history of  shunt 2018 fell yesterday and again today has complaint of left chest wall/ rib pain as well as neck pain denies radicular upper or lower extremity pain not on blood thinners, pain worst with deep breath, neurologically intact per chart review        Images:     reviewed personally                                                                IMPRESSION:  HEAD CT:  1.  When compared to 06/11/2023, there has been interval development of a 0.4 x 0.4 cm area of acute hemorrhage in the left parietal lobe. This may either be intraparenchymal or subarachnoid.      2.  There has been interval decompression of the previously enlarged lateral and third ventricles. The ventricles now appear slitlike.     3.  There is increased low-attenuation change within the right frontal lobe surrounding the shunt catheter. This raises the possibility of possible shunt dysfunction.     CERVICAL SPINE CT:  1.  No acute fracture.     Plan:   Hold all anticoagulants   Systolic BP <140  HOB> 30 degrees  Repeat head CT in 6 hours  Best to transfer to hospital with cranial capability as patient with PE and will likely require anticoagulation and close monitoring of small ICH/ SAH   Appears patient is established at [facility] with recent shunt xray 6/2023 may be best to stay in system with transfer there if not can transfer to [facility] vs [facility] pending bed availability      Kayla Licona PA-C  [facility] Neurosurgery  O: [phone]

## 2023-06-30 NOTE — ED NOTES
EMERGENCY DEPARTMENT SIGN OUT NOTE        ED COURSE AND MEDICAL DECISION MAKING  Patient was signed out to me by Dr Doug Oneill at 7:00 AM.  7:45 AM I was notified of the acute pulmonary emboli found by the radiologist.   8:52 AM I rechecked and updated the patient on the CT results and plan for admission.    In brief, Lo Ochoa is a 82 year old female who initially presented for evaluation of left-sided rib pain and fall.  Patient fell out of bed tonight.  Having worsening pain over the left side.  She is unsure if she hit her head.  She is not on blood thinners.  Complaining of left-sided neck chest and abdominal pain.  Pain is worse when she takes a deep breath.  No nausea or vomiting.  No visual change.  No weakness.  Patient was actually admitted at the beginning of June for weakness and thought to be due to normal pressure hydrocephalus.  She has a history of Lewy body dementia.  Lives in assisted living.  Her daughter did put a lidocaine patch on the left which seemed to help the pain somewhat but still having some pain.     At time of sign out, disposition was pending CT results.    ED Course as of 06/30/23 1211   Fri Jun 30, 2023   0656 Signout: 83 yo F with lewy body dementia, here for evaluation after fall. Here with daughter. CT scans ordered of areas of tenderness/possible trauma. Likely discharge if no injuries seen   0709 CT Cervical Spine w/o Contrast  Based on my interpretation of the scan, vertebral bodies are well aligned, no evidence of acute fracture.   0746 I spoke to radiologist: acute PE (one large in RLL). The pt has no increased WOB. Is not hypoxic, tachypneic and has no R sided chest pain. No signs of heart strain on CT.   0752 Waiting for head CT read by neurorad before starting anticoagulation   0823 I spoke to  radiology, the read will be sent over shortly.   0832 CT Head w/o Contrast  1.  When compared to 06/11/2023, there has been interval development of a 0.4 x 0.4 cm  area of acute hemorrhage in the left parietal lobe. This may either be intraparenchymal or subarachnoid.      2.  There has been interval decompression of the previously enlarged lateral and third ventricles. The ventricles now appear slitlike.     3.  There is increased low-attenuation change within the right frontal lobe surrounding the shunt catheter. This raises the possibility of possible shunt dysfunction.     CERVICAL SPINE CT:   0834 I spoke to rad: small area of acute blood seen. SAH, but could be in parenchyma. 4x4 mm. Also ventricles appear to be smaller, ?overshunted   6194 Neuroradiology paged   9335 I spoke to neurosurgery who agrees on holding anticoagulation for PE given SAH.     Started on PRN hydralazine and labetalol for goal sBP<140     Patient remained GCS 15 here, no pain, headache, sent by medics to [facility] ICU.      Critical Care     Performed by: Dr Dutch Muñoz    Total critical care time: 30 minutes  Critical care was necessary to treat or prevent imminent or life-threatening deterioration of the following conditions:  Traumatic subarachnoid hemorrhage requiring blood pressure monitoring, as needed labetalol, hydralazine for SBP greater than 140.  Critical care was time spent personally by me on the following activities: development of treatment plan with patient or surrogate, discussions with consultants, examination of patient, evaluation of patient's response to treatment, obtaining history from patient or surrogate, ordering and performing treatments and interventions, ordering and review of laboratory studies, ordering and review of radiographic studies, re-evaluation of patient's condition and monitoring for potential decompensation.  Critical care time was exclusive of separately billable procedures and treating other patients.      FINAL IMPRESSION    1. Subarachnoid hemorrhage following injury, no loss of consciousness, initial encounter (H)    2. Chest wall contusion, left, initial  encounter    3. Contusion of chin, initial encounter    4. Fall, initial encounter    5. Single subsegmental pulmonary embolism without acute cor pulmonale (H)        ED MEDS  Medications   HYDROmorphone (PF) (DILAUDID) injection 0.5 mg (0.5 mg Intravenous $Given 6/30/23 0605)   sodium chloride 0.9% infusion (has no administration in time range)   labetalol (NORMODYNE/TRANDATE) injection 10 mg ( Intravenous See Alternative 6/30/23 0948)     Or   hydrALAZINE (APRESOLINE) injection 10 mg (10 mg Intravenous $Given 6/30/23 0948)   iopamidol (ISOVUE-370) solution 100 mL (90 mLs Intravenous $Given 6/30/23 0710)   hydrALAZINE (APRESOLINE) injection 10 mg (10 mg Intravenous $Given 6/30/23 0911)       LAB  Labs Ordered and Resulted from Time of ED Arrival to Time of ED Departure   CBC WITH PLATELETS AND DIFFERENTIAL - Abnormal       Result Value    WBC Count 11.3 (*)     RBC Count 4.22      Hemoglobin 12.5      Hematocrit 39.0      MCV 92      MCH 29.6      MCHC 32.1      RDW 12.8      Platelet Count 333      % Neutrophils 71      % Lymphocytes 20      % Monocytes 7      % Eosinophils 0      % Basophils 1      % Immature Granulocytes 1      NRBCs per 100 WBC 0      Absolute Neutrophils 8.1      Absolute Lymphocytes 2.3      Absolute Monocytes 0.7      Absolute Eosinophils 0.0      Absolute Basophils 0.1      Absolute Immature Granulocytes 0.1      Absolute NRBCs 0.0     COMPREHENSIVE METABOLIC PANEL - Normal    Sodium 137      Potassium 4.1      Chloride 100      Carbon Dioxide (CO2) 29      Anion Gap 8      Urea Nitrogen 22      Creatinine 0.76      Calcium 10.4      Glucose 109      Alkaline Phosphatase 95      AST 28      ALT 17      Protein Total 6.7      Albumin 3.7      Bilirubin Total 0.6      GFR Estimate 79     INR - Normal    INR 1.07     PARTIAL THROMBOPLASTIN TIME - Normal    aPTT 26           RADIOLOGY    CTA Chest Abdomen Pelvis w Contrast   Final Result   Abnormal   IMPRESSION:   1.  Nothing for acute  traumatic injury in the chest, abdomen, or pelvis.      2.  Single moderate sized subsegmental pulmonary emboli right lower lobe.      3.  Small left pleural effusion.         [Critical Result: Acute pulmonary emboli.]      Finding was identified on 6/30/2023 7:37 AM CDT.       Dr. Muñoz was contacted by me on 6/30/2023 7:45 AM CDT and verbalized understanding of the critical result.          CT Cervical Spine w/o Contrast   Final Result   Addendum (preliminary) 1 of 1   Findings discussed with Dr. Muñoz on 06/30/2023 at 8:35 AM      Final   IMPRESSION:   HEAD CT:   1.  When compared to 06/11/2023, there has been interval development of a 0.4 x 0.4 cm area of acute hemorrhage in the left parietal lobe. This may either be intraparenchymal or subarachnoid.       2.  There has been interval decompression of the previously enlarged lateral and third ventricles. The ventricles now appear slitlike.      3.  There is increased low-attenuation change within the right frontal lobe surrounding the shunt catheter. This raises the possibility of possible shunt dysfunction.      CERVICAL SPINE CT:   1.  No acute fracture.      CT Head w/o Contrast   Final Result   Addendum (preliminary) 1 of 1   Findings discussed with Dr. Muñoz on 06/30/2023 at 8:35 AM      Final   IMPRESSION:   HEAD CT:   1.  When compared to 06/11/2023, there has been interval development of a 0.4 x 0.4 cm area of acute hemorrhage in the left parietal lobe. This may either be intraparenchymal or subarachnoid.       2.  There has been interval decompression of the previously enlarged lateral and third ventricles. The ventricles now appear slitlike.      3.  There is increased low-attenuation change within the right frontal lobe surrounding the shunt catheter. This raises the possibility of possible shunt dysfunction.      CERVICAL SPINE CT:   1.  No acute fracture.      CT External Imaging Head   Final Result      XR External Imaging Head   Final  Result      XR External Imaging Head   Final Result          DISCHARGE MEDS  Discharge Medication List as of 6/30/2023 12:01 PM          Jose L Muñoz M.D.  [facility] EMERGENCY ROOM  [address] [id number] [phone]     Jose L Muñoz MD  06/30/23 2871

## 2023-06-30 NOTE — PHARMACY-ADMISSION MEDICATION HISTORY
Pharmacist Admission Medication History    Admission medication history is complete. The information provided in this note is only as accurate as the sources available at the time of the update.    Medication reconciliation/reorder completed by provider prior to medication history? No    Information Source(s): Patient and CareEverywhere/SureScripts via in-person--daughter Jeri provided pictures of medication bottles to confirm PTA med list info    Pertinent Information:     Changes made to PTA medication list:    Added: All the medications listed were added to the PTA Med List during this encounter.     Deleted: none    Changed: none    Medication Affordability:  Not including over the counter (OTC) medications, was there a time in the past 3 months when you did not take your medications as prescribed because of cost?: No    Allergies reviewed with patient and updates made in EHR: yes    Medications available for use during hospital stay: NONE.     Medication History Completed By: Erik Leonardo [facility] 6/30/2023 8:56 AM    PTA Med List   Medication Sig Last Dose     acetaminophen (TYLENOL) 325 MG tablet Take 650 mg by mouth every 4 hours as needed for mild pain 6/30/2023 at am     Cranberry 500 MG CAPS Take 500 mg by mouth daily      docusate sodium (COLACE) 100 MG capsule Take 100 mg by mouth every other day Every other day at HS 6/29/2023 at hs     donepezil (ARICEPT) 10 MG tablet Take 20 mg by mouth At Bedtime 6/29/2023     ferrous fumarate 65 mg, [place]. FE,-Vitamin C 125 mg (VITRON C)  MG TABS tablet Take 2 tablets by mouth daily 6/29/2023     FLUoxetine (PROZAC) 40 MG capsule Take 40 mg by mouth At Bedtime 6/29/2023     mirtazapine (REMERON) 15 MG tablet Take 15 mg by mouth At Bedtime 6/29/2023     omeprazole (PRILOSEC) 20 MG DR capsule Take 20 mg by mouth daily 6/29/2023     Vitamin D3 (CHOLECALCIFEROL) 25 mcg (1000 units) tablet Take 25 mcg by mouth daily 6/29/2023

## 2023-06-30 NOTE — ED PROVIDER NOTES
EMERGENCY DEPARTMENT ENCOUNTER      NAME: Lo Ochoa  AGE: 82 year old female  YOB: 1940  MRN: [id number]  EVALUATION DATE & TIME: 6/30/2023  5:25 AM    PCP: No Ref-Primary, Physician    ED PROVIDER: Doug Oneill M.D.      Chief Complaint   Patient presents with     Fall     Rib pain.         FINAL IMPRESSION:  1. Chest wall contusion, left, initial encounter    2. Contusion of chin, initial encounter    3. Fall, initial encounter          ED COURSE & MEDICAL DECISION MAKING:    Pertinent Labs & Imaging studies reviewed. (See chart for details)  82 year old female presents to the Emergency Department for evaluation of fall.  Patient had left-sided chest and abdominal pain.  May have hit her head.  Not on blood thinners.  Has some tenderness of the left chest and left upper abdomen.  Also tenderness over the left aspect of her neck and there is a contusion on her chin.  Given this we will get a CT scan of her head or neck or chest abdomen pelvis.  What had a recent admission for fatigue and weakness.  Likely this is due to her deconditioning.  Has been using a walker but did not use it today when she fell out of bed.  Labs otherwise unremarkable.  Patient will be signed out to Dr. Muñoz pending CT scans.  Likely if CTs scan negative can go back to her assisted living.  5:46 AM I met with the patient to gather history and to perform my initial exam. I discussed the plan for care while in the Emergency Department.  At the conclusion of the encounter I discussed the results of all of the tests and the disposition. The questions were answered. The patient or family acknowledged understanding and was agreeable with the care plan.     Medical Decision Making    History:    Supplemental history from: Documented in chart, if applicable and Family Member/Significant Other    External Record(s) reviewed: Documented in chart, if applicable. and Inpatient Record: recent hospitalization for  fall    Work Up:    Chart documentation includes differential considered and any EKGs or imaging independently interpreted by provider, where specified.    In additional to work up documented, I considered the following work up: Documented in chart, if applicable.    External consultation:    Discussion of management with another provider: Documented in chart, if applicable    Complicating factors:    Care impacted by chronic illness: Dementia    Care affected by social determinants of health: Access to Medical Care    Disposition considerations: Admission considered. Patient was signed out to the oncoming physician, disposition pending.            MEDICATIONS GIVEN IN THE EMERGENCY:  Medications   HYDROmorphone (PF) (DILAUDID) injection 0.5 mg (0.5 mg Intravenous $Given 6/30/23 0605)   iopamidol (ISOVUE-370) solution 100 mL (has no administration in time range)       NEW PRESCRIPTIONS STARTED AT TODAY'S ER VISIT  New Prescriptions    No medications on file          =================================================================    HPI    Patient information was obtained from: Patient and Daughter.       Lo Ochoa is a 82 year old female with a pertinent history of Lewy Body Dementia.  who presents to this ED  for evaluation of left-sided rib pain and fall.  Patient fell out of bed tonight.  Having worsening pain over the left side.  She is unsure if she hit her head.  She is not on blood thinners.  Complaining of left-sided neck chest and abdominal pain.  Pain is worse when she takes a deep breath.  No nausea or vomiting.  No visual change.  No weakness.  Patient was actually admitted at the beginning of June for weakness and thought to be due to normal pressure hydrocephalus.  She has a history of Lewy body dementia.  Lives in assisted living.  Her daughter did put a lidocaine patch on the left which seemed to help the pain somewhat but still having some pain.        PAST MEDICAL HISTORY:  No past  "medical history on file.    PAST SURGICAL HISTORY:  No past surgical history on file.        CURRENT MEDICATIONS:    Current Facility-Administered Medications   Medication     HYDROmorphone (PF) (DILAUDID) injection 0.5 mg     iopamidol (ISOVUE-370) solution 100 mL     No current outpatient medications on file.         ALLERGIES:  No Known Allergies    FAMILY HISTORY:  No family history on file.    SOCIAL HISTORY:   Social History     Socioeconomic History     Marital status:        VITALS:  BP (!) 192/92   Pulse 71   Temp 97.7  F (36.5  C) (Oral)   Resp 16   Ht 1.676 m (5' 6\")   Wt 66.2 kg (146 lb)   SpO2 96%   BMI 23.57 kg/m      PHYSICAL EXAM    Physical Exam  Constitutional:       General: She is not in acute distress.     Appearance: She is not diaphoretic.   HENT:      Mouth/Throat:      Comments: Contusion over the right side of her chin  Eyes:      Pupils: Pupils are equal, round, and reactive to light.   Cardiovascular:      Rate and Rhythm: Normal rate and regular rhythm.      Heart sounds: Normal heart sounds.   Pulmonary:      Effort: No respiratory distress.      Breath sounds: Normal breath sounds.   Chest:      Chest wall: Tenderness (  Tenderness over left ribs 7 and 8.  There is a contusion noted there.) present.   Abdominal:      General: Bowel sounds are normal.      Palpations: Abdomen is soft.      Tenderness: There is abdominal tenderness ( luq ).   Musculoskeletal:         General: Normal range of motion.      Cervical back: Tenderness ( Left side of her neck.) present.      Thoracic back: No tenderness.      Lumbar back: No tenderness.   Skin:     Findings: No abrasion or laceration.   Neurological:      General: No focal deficit present.      Mental Status: She is alert.      Comments: 5 out of 5 strength in bilateral upper and lower extremities.  Sensation intact in all 4 extremes.  Cranial nerves intact.  No pronator drift             LAB:  All pertinent labs reviewed and " interpreted.  Labs Ordered and Resulted from Time of ED Arrival to Time of ED Departure   CBC WITH PLATELETS AND DIFFERENTIAL - Abnormal       Result Value    WBC Count 11.3 (*)     RBC Count 4.22      Hemoglobin 12.5      Hematocrit 39.0      MCV 92      MCH 29.6      MCHC 32.1      RDW 12.8      Platelet Count 333      % Neutrophils 71      % Lymphocytes 20      % Monocytes 7      % Eosinophils 0      % Basophils 1      % Immature Granulocytes 1      NRBCs per 100 WBC 0      Absolute Neutrophils 8.1      Absolute Lymphocytes 2.3      Absolute Monocytes 0.7      Absolute Eosinophils 0.0      Absolute Basophils 0.1      Absolute Immature Granulocytes 0.1      Absolute NRBCs 0.0     COMPREHENSIVE METABOLIC PANEL - Normal    Sodium 137      Potassium 4.1      Chloride 100      Carbon Dioxide (CO2) 29      Anion Gap 8      Urea Nitrogen 22      Creatinine 0.76      Calcium 10.4      Glucose 109      Alkaline Phosphatase 95      AST 28      ALT 17      Protein Total 6.7      Albumin 3.7      Bilirubin Total 0.6      GFR Estimate 79     INR - Normal    INR 1.07     PARTIAL THROMBOPLASTIN TIME - Normal    aPTT 26         RADIOLOGY:  Reviewed all pertinent imaging. Please see official radiology report.  CT Head w/o Contrast    (Results Pending)   CT Cervical Spine w/o Contrast    (Results Pending)   CTA Chest Abdomen Pelvis w Contrast    (Results Pending)         Doug Oneill M.D.  Emergency Medicine  [facility] EMERGENCY ROOM  [address] [address] [phone]  Dept: [phone]     Doug Oneill MD  06/30/23 0696

## 2023-06-30 NOTE — ED TRIAGE NOTES
Per medics, patient fell yesterday and now having left sided chest wall and flank pain.  Daughter reports patient fell again today.     Triage Assessment     Row Name 06/30/23 0577       Triage Assessment (Adult)    Airway WDL WDL       Respiratory WDL    Respiratory WDL WDL       Skin Circulation/Temperature WDL    Skin Circulation/Temperature WDL WDL       Cardiac WDL    Cardiac WDL WDL       Peripheral/Neurovascular WDL    Peripheral Neurovascular WDL WDL       Cognitive/Neuro/Behavioral WDL    Cognitive/Neuro/Behavioral WDL WDL